# Patient Record
Sex: FEMALE | Race: WHITE | NOT HISPANIC OR LATINO | Employment: FULL TIME | RURAL
[De-identification: names, ages, dates, MRNs, and addresses within clinical notes are randomized per-mention and may not be internally consistent; named-entity substitution may affect disease eponyms.]

---

## 2022-11-09 NOTE — PROGRESS NOTES
[facility] Cardiology  Follow Up Visit  Kristen Matias  2000    VISIT DATE:  11/10/22    PCP:   Jayro Farooq  [address]  [place] [address]          CC:  Palpitations, Chest Pain, and Shortness of Breath      Problem List:  1.  Raynaud's  2.  Possible bicuspid aortic valve  3.  Fibromyalgia  4.  Bipolar disorder    Echo March 2021:  EF 55 to 60%, possible bicuspid aortic valve with no evidence of aortic stenosis or regurgitation, normal aortic    Holter:  Bouts of sinus tachycardia between 120 and 140 bpm.  No arrhythmias.  Average heart rate 83 bpm.    History of Present Illness:  Kristen Matias  Is a 21 y.o. female with pertinent cardiac history detailed above.  Patient being seen for palpitation.  Has seen cardiology for this previously and did have an excess caffeine intake which she has cut down.    She has had issues with frequent tachycardia and palpitations.  She is on metoprolol and has had escalating doses.  Prior Holter showed frequent sinus tachycardia no  significant ectopy or arrhythmias.    Symptoms include shortness of breath and some chest pain.  No lightheadedness or dizziness.   She has been on betablockers for two years.  Prior to initiating beta-blockers minimal activity would cause significant increase in heart rate to 170 and 180 bpm.  She takes medicine for bipolar disorder who is otherwise healthy.  In regards to the possible by cuspid aortic valve she has no first-degree relatives with known valvular disease      There are no problems to display for this patient.      No Known Allergies    Social History     Socioeconomic History   • Marital status: Single   Tobacco Use   • Smoking status: Former     Types: Cigarettes     Quit date: 2019     Years since quitting: 3.8   • Tobacco comments:     Does vape   Substance and Sexual Activity   • Alcohol use: Yes     Alcohol/week: 1.0 - 2.0 standard drink     Types: 1 - 2 Cans of beer per week      "Comment: rare   • Sexual activity: Defer       Family History   Problem Relation Age of Onset   • Hypertension Mother    • Hypertension Father        Current Medications:    Current Outpatient Medications:   •  Cholecalciferol (Vitamin D3) 50 MCG (2000 UT) tablet, Take 1 tablet by mouth Daily., Disp: , Rfl:   •  ibuprofen (ADVIL,MOTRIN) 800 MG tablet, As Needed., Disp: , Rfl:   •  lamoTRIgine (LaMICtal) 25 MG tablet, Take 1 tablet by mouth Daily., Disp: , Rfl:   •  bisoprolol (ZEBeta) 10 MG tablet, Take 1 tablet by mouth Daily., Disp: 30 tablet, Rfl: 6  •  FLUoxetine (PROzac) 10 MG capsule, Take 1 capsule by mouth Daily., Disp: , Rfl:      Review of Systems   Cardiovascular: Positive for dyspnea on exertion, irregular heartbeat and palpitations.       Vitals:    11/10/22 1302   BP: 108/68   BP Location: Left arm   Patient Position: Sitting   Pulse: 94   SpO2: 98%   Weight: 54.4 kg (120 lb)   Height: 162.6 cm (64\")       Physical Exam  Constitutional:       Appearance: Normal appearance.   Neck:      Vascular: No carotid bruit.   Cardiovascular:      Rate and Rhythm: Normal rate and regular rhythm.      Pulses: Normal pulses.      Heart sounds: Normal heart sounds.   Pulmonary:      Effort: Pulmonary effort is normal.      Breath sounds: Normal breath sounds.   Musculoskeletal:      Right lower leg: No edema.      Left lower leg: No edema.   Skin:     General: Skin is warm and dry.   Neurological:      General: No focal deficit present.      Mental Status: She is alert.         Diagnostic Data:    ECG 12 Lead    Date/Time: 11/10/2022 1:17 PM  Performed by: Hipolito Bello MD  Authorized by: Hipolito Bello MD   Comparison: compared with previous ECG from 2/19/2021  Similar to previous ECG  Rhythm: sinus arrhythmia  Rate: normal  BPM: 80  QRS axis: normal  Other findings: non-specific ST-T wave changes    Clinical impression: non-specific ECG          No results found for: CHLPL, TRIG, HDL, " LDLDIRECT  No results found for: GLUCOSE, BUN, CREATININE, NA, K, CL, CO2, CREATININE, BUN  No results found for: HGBA1C  No results found for: WBC, HGB, HCT, PLT    Assessment:   Diagnosis Plan   1. Palpitations  Holter Monitor - 48 Hour          Plan:      1.  Palpitations/inappropriate sinus tach  -reports normal thyroid levels on recent check  -initial response to metoprolol but now having breakthrough symptoms  -We will transition to bisoprolol 10 mg daily and repeat 14-day Holter monitor  -Further recommendations pending these results    2.  Possible bicuspid valve   -normal functioning valve and normal aorta  -Will monitor periodically with follow-up echocardiograms    Further recommendations pending her Holter monitor.      Hipolito Bello MD [facility]

## 2022-11-10 ENCOUNTER — OFFICE VISIT (OUTPATIENT)
Dept: CARDIOLOGY | Facility: CLINIC | Age: 22
End: 2022-11-10

## 2022-11-10 VITALS
WEIGHT: 120 LBS | DIASTOLIC BLOOD PRESSURE: 68 MMHG | HEART RATE: 94 BPM | BODY MASS INDEX: 20.49 KG/M2 | OXYGEN SATURATION: 98 % | HEIGHT: 64 IN | SYSTOLIC BLOOD PRESSURE: 108 MMHG

## 2022-11-10 DIAGNOSIS — R00.2 PALPITATIONS: Primary | ICD-10-CM

## 2022-11-10 PROCEDURE — 93000 ELECTROCARDIOGRAM COMPLETE: CPT | Performed by: INTERNAL MEDICINE

## 2022-11-10 PROCEDURE — 99204 OFFICE O/P NEW MOD 45 MIN: CPT | Performed by: INTERNAL MEDICINE

## 2022-11-10 RX ORDER — PAROXETINE HYDROCHLORIDE 20 MG/1
20 TABLET, FILM COATED ORAL EVERY MORNING
COMMUNITY
Start: 2022-10-11 | End: 2022-11-10

## 2022-11-10 RX ORDER — BISOPROLOL FUMARATE 10 MG/1
10 TABLET, FILM COATED ORAL DAILY
Qty: 30 TABLET | Refills: 6 | Status: SHIPPED | OUTPATIENT
Start: 2022-11-10

## 2022-11-10 RX ORDER — IBUPROFEN 800 MG/1
TABLET ORAL AS NEEDED
COMMUNITY
Start: 2022-08-03

## 2022-11-10 RX ORDER — METOPROLOL TARTRATE 50 MG/1
50 TABLET, FILM COATED ORAL 2 TIMES DAILY WITH MEALS
COMMUNITY
Start: 2022-10-18 | End: 2022-11-10 | Stop reason: ALTCHOICE

## 2022-11-10 RX ORDER — CHOLECALCIFEROL (VITAMIN D3) 125 MCG
1 CAPSULE ORAL DAILY
COMMUNITY
Start: 2022-10-18

## 2022-11-10 RX ORDER — FLUOXETINE 10 MG/1
10 CAPSULE ORAL DAILY
COMMUNITY
Start: 2022-10-27

## 2022-11-10 RX ORDER — LAMOTRIGINE 25 MG/1
25 TABLET ORAL DAILY
COMMUNITY
Start: 2022-10-28

## 2022-11-11 ENCOUNTER — TELEPHONE (OUTPATIENT)
Dept: CARDIOLOGY | Facility: CLINIC | Age: 22
End: 2022-11-11

## 2022-11-11 NOTE — TELEPHONE ENCOUNTER
Patient called and states that she is having all bottom teeth removed with Dr. Armendariz in [place]. Dr. Armendariz did not want to use local anesthetic without cardiac clearance.         What is patient's cardiac risk assessment?    Please advise.           F# [phone]

## 2022-11-11 NOTE — TELEPHONE ENCOUNTER
After appointment yesterday 11/10/22, patient found out that her maternal grandfather and first cousin, on mother's side, both have bicuspid aortic valve, as well. Pt said that it is unknown if her mother has bicuspid aortic valve.     NSK notified.

## 2022-12-06 ENCOUNTER — TELEPHONE (OUTPATIENT)
Dept: CARDIOLOGY | Facility: CLINIC | Age: 22
End: 2022-12-06

## 2022-12-06 NOTE — TELEPHONE ENCOUNTER
----- Message from Hipolito Bello MD sent at 12/5/2022  3:04 PM EST -----  Patient's heart monitor showed no significant arrhythmias.  Her average heart rate was in the normal range at 68.

## 2023-06-07 RX ORDER — BISOPROLOL FUMARATE 10 MG/1
TABLET, FILM COATED ORAL
Qty: 90 TABLET | Refills: 3 | Status: SHIPPED | OUTPATIENT
Start: 2023-06-07

## 2023-08-17 ENCOUNTER — TELEPHONE (OUTPATIENT)
Dept: CARDIOLOGY | Facility: CLINIC | Age: 23
End: 2023-08-17

## 2023-08-17 NOTE — TELEPHONE ENCOUNTER
Caller: Kristen Matias    Relationship to patient: Self    Best call back number: [phone]    Type of visit: FOLLOW UP     Requested date: ASAP      If rescheduling, when is the original appointment: 8-17-23     Additional notes:PT WENT TO THE WRONG LOCATION AND IS NEEDING TO R/S. PT HAS BEEN R/S FOR 12-21-23 BUT IS REQUESTING TO BE SEEN BEFORE THAN. PLEASE REACH OUT TO PT TO FURTHER ADVISE.

## 2023-12-20 NOTE — PROGRESS NOTES
[facility] Cardiology  Follow Up Visit  Kristen Matias  2000    VISIT DATE:  23    PCP:   Jayro Farooq MD  [address]  [place] [address]    CC:  Palpitations      Problem List:  1.  Raynaud's  2.  Possible bicuspid aortic valve  3.  Fibromyalgia  4.  Bipolar disorder     Echo 2021:  EF 55 to 60%, possible bicuspid aortic valve with no evidence of aortic stenosis or regurgitation     Holter:  Bouts of sinus tachycardia between 120 and 140 bpm.  No arrhythmias.  Average heart rate 83 bpm.      History of Present Illness:  Kristen Matias  Is a 23 y.o. female with pertinent cardiac history detailed above.  She has rare palpitations.  Symptoms appear improved on  bisoprolol.  Has found multiple family members with biscupid AV.  No other cardiac complaints today      There are no problems to display for this patient.      No Known Allergies    Social History     Socioeconomic History    Marital status: Single   Tobacco Use    Smoking status: Former     Types: Cigarettes     Quit date:      Years since quittin.9     Passive exposure: Past    Tobacco comments:     Does vape   Substance and Sexual Activity    Alcohol use: Yes     Alcohol/week: 1.0 - 2.0 standard drink of alcohol     Types: 1 - 2 Cans of beer per week     Comment: rare    Sexual activity: Defer       Family History   Problem Relation Age of Onset    Hypertension Mother     Hypertension Father        Current Medications:    Current Outpatient Medications:     ARIPiprazole (ABILIFY) 5 MG tablet, Take 0.5 tablets by mouth Daily., Disp: , Rfl:     bisoprolol (ZEBeta) 10 MG tablet, Take 1 tablet by mouth once daily, Disp: 90 tablet, Rfl: 3    busPIRone (BUSPAR) 7.5 MG tablet, Take 1 tablet by mouth 2 (Two) Times a Day., Disp: , Rfl:     celecoxib (CeleBREX) 200 MG capsule, Take 1 capsule by mouth Daily., Disp: , Rfl:     Cholecalciferol (Vitamin D3) 50 MCG ( UT) tablet, Take 1 tablet by mouth  "Daily., Disp: , Rfl:     ibuprofen (ADVIL,MOTRIN) 800 MG tablet, As Needed., Disp: , Rfl:     lamoTRIgine (LaMICtal) 25 MG tablet, Take 8 tablets by mouth Daily., Disp: , Rfl:     vitamin B-6 (pyridoxine) 50 MG tablet, Take 1 tablet by mouth Daily., Disp: , Rfl:      Review of Systems   Cardiovascular: Negative.    Respiratory: Negative.         Vitals:    12/21/23 1212   BP: 100/78   BP Location: Left arm   Patient Position: Sitting   Pulse: 72   SpO2: 100%   Weight: 51.2 kg (112 lb 12.8 oz)   Height: 162.6 cm (64\")       Physical Exam  Constitutional:       Appearance: Normal appearance.   Cardiovascular:      Rate and Rhythm: Normal rate and regular rhythm.      Pulses: Normal pulses.      Heart sounds: Normal heart sounds.   Pulmonary:      Effort: Pulmonary effort is normal.      Breath sounds: Normal breath sounds.   Musculoskeletal:      Right lower leg: No edema.      Left lower leg: No edema.   Neurological:      Mental Status: She is alert.         Diagnostic Data:  Procedures  No results found for: \"CHLPL\", \"TRIG\", \"HDL\", \"LDLDIRECT\"  No results found for: \"GLUCOSE\", \"BUN\", \"CREATININE\", \"NA\", \"K\", \"CL\", \"CO2\"  No results found for: \"HGBA1C\"  No results found for: \"WBC\", \"HGB\", \"HCT\", \"PLT\"    Assessment:  No diagnosis found.    Plan:    1.  Palpitations/inappropriate sinus tach  -continue bisoprolol 10 mg   -Monitor showed no arrhythmias and average heart rate 68     2.  Possible bicuspid valve   -Family history of bicuspid valve  -normal functioning valve and normal aorta on prior  -repeat echo to re-eval valve function            Hipolito Bello MD [facility]     "

## 2023-12-21 ENCOUNTER — OFFICE VISIT (OUTPATIENT)
Dept: CARDIOLOGY | Facility: CLINIC | Age: 23
End: 2023-12-21
Payer: COMMERCIAL

## 2023-12-21 VITALS
WEIGHT: 112.8 LBS | DIASTOLIC BLOOD PRESSURE: 78 MMHG | BODY MASS INDEX: 19.26 KG/M2 | SYSTOLIC BLOOD PRESSURE: 100 MMHG | HEIGHT: 64 IN | OXYGEN SATURATION: 100 % | HEART RATE: 72 BPM

## 2023-12-21 DIAGNOSIS — Q23.1 BICUSPID AORTIC VALVE: Primary | ICD-10-CM

## 2023-12-21 PROCEDURE — 99214 OFFICE O/P EST MOD 30 MIN: CPT | Performed by: INTERNAL MEDICINE

## 2023-12-21 RX ORDER — LANOLIN ALCOHOL/MO/W.PET/CERES
50 CREAM (GRAM) TOPICAL DAILY
COMMUNITY
Start: 2023-06-06 | End: 2024-06-05

## 2023-12-21 RX ORDER — BUSPIRONE HYDROCHLORIDE 7.5 MG/1
1 TABLET ORAL 2 TIMES DAILY
COMMUNITY

## 2023-12-21 RX ORDER — ARIPIPRAZOLE 5 MG/1
0.5 TABLET ORAL DAILY
COMMUNITY
Start: 2023-12-13

## 2023-12-21 RX ORDER — CELECOXIB 200 MG/1
200 CAPSULE ORAL DAILY
COMMUNITY
Start: 2023-12-04

## 2024-03-13 ENCOUNTER — OUTSIDE FACILITY SERVICE (OUTPATIENT)
Dept: CARDIOLOGY | Facility: CLINIC | Age: 24
End: 2024-03-13
Payer: COMMERCIAL

## 2024-03-14 ENCOUNTER — TELEPHONE (OUTPATIENT)
Dept: CARDIOLOGY | Facility: CLINIC | Age: 24
End: 2024-03-14
Payer: COMMERCIAL

## 2024-03-14 NOTE — TELEPHONE ENCOUNTER
----- Message from Hipolito Bello MD sent at 3/14/2024 12:56 PM EDT -----  Regarding: Echo results  Could we let patient know that echocardiogram from [facility] did show a bicuspid aortic valve.  The valve is working well.  Everything else on the echo looks normal.  We do not need to make any changes right now.  I will plan on repeating an echo every few years to follow-up.

## 2024-05-30 RX ORDER — BISOPROLOL FUMARATE 10 MG/1
TABLET, FILM COATED ORAL
Qty: 90 TABLET | Refills: 3 | Status: SHIPPED | OUTPATIENT
Start: 2024-05-30

## 2025-02-05 NOTE — PROGRESS NOTES
[facility] Cardiology  Follow Up Visit  Kristen Matias  2000    VISIT DATE:  25    PCP:   Criss Barroso, APRN  [address]  [facility] [address]          CC:  No chief complaint on file.      Problem List:  1.  Raynaud's  2.  Bicuspid valve  3.  Fibromyalgia  4.  Bipolar disorder     Echo 2024:  Normal EF, bicuspid aortic valve with normal function, normal aortic size     Holter:  Bouts of sinus tachycardia between 120 and 140 bpm.  No arrhythmias.  Average heart rate 83 bpm.            History of Present Illness:  Kristen Matias  Is a 24 y.o. female with pertinent cardiac history detailed above.  Patient states bisoprolol is treating her sinus tachycardia well.  Blood pressure does remain on the low end of normal.  Patient will get orthostatic if she stands up quickly and has had a syncopal episode with this.  She does try to stay well-hydrated.  Encouraged her to supplement a sports drink or electrolyte water into her regimen and to help transition positions slowly.  We also discussed echo findings of bicuspid aortic valve and recommendations for family screening.  Patient does have one 4-year-old child that she will get evaluated.  No other known family history of bicuspid valve      There are no active problems to display for this patient.      No Known Allergies    Social History     Socioeconomic History    Marital status: Single   Tobacco Use    Smoking status: Former     Current packs/day: 0.00     Types: Cigarettes     Quit date: 2019     Years since quittin.1     Passive exposure: Past    Tobacco comments:     Does vape   Substance and Sexual Activity    Alcohol use: Yes     Alcohol/week: 1.0 - 2.0 standard drink of alcohol     Types: 1 - 2 Cans of beer per week     Comment: rare    Sexual activity: Defer       Family History   Problem Relation Age of Onset    Hypertension Mother     Hypertension Father        Current Medications:    Current Outpatient Medications:     " ARIPiprazole (ABILIFY) 5 MG tablet, Take 0.5 tablets by mouth Daily., Disp: , Rfl:     bisoprolol (ZEBeta) 10 MG tablet, Take 1 tablet by mouth once daily, Disp: 90 tablet, Rfl: 3    busPIRone (BUSPAR) 7.5 MG tablet, Take 1 tablet by mouth 2 (Two) Times a Day., Disp: , Rfl:     celecoxib (CeleBREX) 200 MG capsule, Take 1 capsule by mouth Daily., Disp: , Rfl:     Cholecalciferol (Vitamin D3) 50 MCG (2000 UT) tablet, Take 1 tablet by mouth Daily., Disp: , Rfl:     ibuprofen (ADVIL,MOTRIN) 800 MG tablet, As Needed., Disp: , Rfl:     lamoTRIgine (LaMICtal) 25 MG tablet, Take 8 tablets by mouth Daily., Disp: , Rfl:      Review of Systems   Cardiovascular:  Positive for syncope. Negative for palpitations.   Neurological:  Positive for light-headedness.       There were no vitals filed for this visit.    Physical Exam  Constitutional:       Appearance: Normal appearance.   Neck:      Vascular: No carotid bruit.   Cardiovascular:      Rate and Rhythm: Normal rate and regular rhythm.      Pulses: Normal pulses.      Heart sounds: Murmur heard.   Pulmonary:      Effort: Pulmonary effort is normal.      Breath sounds: Normal breath sounds.   Neurological:      Mental Status: She is alert.         Diagnostic Data:  Procedures  No results found for: \"CHLPL\", \"TRIG\", \"HDL\", \"LDLDIRECT\"  No results found for: \"GLUCOSE\", \"BUN\", \"CREATININE\", \"NA\", \"K\", \"CL\", \"CO2\"  No results found for: \"HGBA1C\"  No results found for: \"WBC\", \"HGB\", \"HCT\", \"PLT\"    Assessment:  No diagnosis found.    Plan:    1.  Palpitations/inappropriate sinus tach  -continue bisoprolol 10 mg   -Monitor showed no arrhythmias and average heart rate 68  -Encouraged her to stay well-hydrated with sports drinks to increase sodium retention     2.  Bicuspid valve  -Family history of bicuspid valve  -Echo March 2024: Bicuspid aortic valve with no stenosis or regurgitation.  EF 55 to 59% normal aortic size  -Tentatively plan next echo in 2027  -She does have a " 4-year-old child that I recommend get screened for bicuspid valve via their pediatrician  -Asymptomatic at this time            ACP discussion was held with the patient during this visit. Patient does not have an advance directive, declines further assistance.      Hipolito Bello MD [facility]

## 2025-02-06 ENCOUNTER — OFFICE VISIT (OUTPATIENT)
Dept: CARDIOLOGY | Facility: CLINIC | Age: 25
End: 2025-02-06
Payer: COMMERCIAL

## 2025-02-06 VITALS
HEART RATE: 74 BPM | DIASTOLIC BLOOD PRESSURE: 66 MMHG | HEIGHT: 63 IN | BODY MASS INDEX: 23.5 KG/M2 | WEIGHT: 132.6 LBS | SYSTOLIC BLOOD PRESSURE: 98 MMHG | OXYGEN SATURATION: 99 %

## 2025-02-06 DIAGNOSIS — I47.11 INAPPROPRIATE SINUS TACHYCARDIA: ICD-10-CM

## 2025-02-06 DIAGNOSIS — Q23.81 BICUSPID AORTIC VALVE: Primary | ICD-10-CM

## 2025-02-06 PROCEDURE — 99214 OFFICE O/P EST MOD 30 MIN: CPT | Performed by: INTERNAL MEDICINE
